# Patient Record
Sex: FEMALE | Race: WHITE | NOT HISPANIC OR LATINO | Employment: FULL TIME | URBAN - METROPOLITAN AREA
[De-identification: names, ages, dates, MRNs, and addresses within clinical notes are randomized per-mention and may not be internally consistent; named-entity substitution may affect disease eponyms.]

---

## 2022-11-19 ENCOUNTER — OFFICE VISIT (OUTPATIENT)
Dept: URGENT CARE | Facility: URGENT CARE | Age: 42
End: 2022-11-19
Payer: COMMERCIAL

## 2022-11-19 VITALS
SYSTOLIC BLOOD PRESSURE: 120 MMHG | TEMPERATURE: 97.1 F | DIASTOLIC BLOOD PRESSURE: 66 MMHG | OXYGEN SATURATION: 98 % | HEART RATE: 70 BPM | WEIGHT: 187.7 LBS

## 2022-11-19 DIAGNOSIS — J02.9 SORE THROAT: Primary | ICD-10-CM

## 2022-11-19 LAB
DEPRECATED S PYO AG THROAT QL EIA: NEGATIVE
GROUP A STREP BY PCR: NOT DETECTED

## 2022-11-19 PROCEDURE — 87651 STREP A DNA AMP PROBE: CPT | Performed by: PHYSICIAN ASSISTANT

## 2022-11-19 PROCEDURE — 99203 OFFICE O/P NEW LOW 30 MIN: CPT | Performed by: PHYSICIAN ASSISTANT

## 2022-11-19 NOTE — PROGRESS NOTES
Assessment & Plan     Sore throat  Likely viral, await PCR, she is nontoxic appearing, no worrisome trismus, drooling, hot potato voice or signs on exam.  Will hold off on abx at this time.   Push fluids, rest and ibuprofen or tylenol for comfort.    RTC for persistent or worsening sx.     - Streptococcus A Rapid Screen w/Reflex to PCR - Clinic Collect  - Group A Streptococcus PCR Throat Swab  Trish Ames PA-C    [facility] [place] Gavin is a 42 year old female who presents to clinic today for the following health issues:  Chief Complaint   Patient presents with     Pharyngitis     With swollen glands     Otalgia     HPI: pt presents to urgent care with concerns re: ST.  Able to swallow saliva but painful  No fevers, no uri sx.    6 year old son sick with bad cough.    Some mild  respiratory sx with negative covid 19 test about a week ago, sx improving generally.         Review of Systems  Constitutional, HEENT, cardiovascular, pulmonary, gi and gu systems are negative, except as otherwise noted.      Objective    /66 (BP Location: Right arm)   Pulse 70   Temp 97.1  F (36.2  C) (Tympanic)   Wt 85.1 kg (187 lb 11.2 oz)   SpO2 98%   Physical Exam   Pt is in no acute distress and appears well  Ears patent B:  TM s intact, non-injected. All land marks easily visibile    Nasal mucosa is non-edematous, no discharge.    Pharynx: erythematous, tonsils 2+ hypertrophied, mild  exudate   Neck supple:tender anterior cervical adenopathy  Lungs: CTA  Heart: RRR, no murmur, no thrills or heaves   Ext: no edema  Skin: no rashes    Results for orders placed or performed in visit on 11/19/22   Streptococcus A Rapid Screen w/Reflex to PCR - Clinic Collect     Status: Normal    Specimen: Throat; Swab   Result Value Ref Range    Group A Strep antigen Negative Negative   Group A Streptococcus PCR Throat Swab     Status: Normal    Specimen: Throat; Swab   Result Value Ref Range     Group A strep by PCR Not Detected Not Detected    Narrative    The Xpert Xpress Strep A test, performed on the [employer], is a rapid, qualitative in vitro diagnostic test for the detection of Streptococcus pyogenes (Group A ß-hemolytic Streptococcus, Strep A) in throat swab specimens from patients with signs and symptoms of pharyngitis. The Xpert Xpress Strep A test can be used as an aid in the diagnosis of Group A Streptococcal pharyngitis. The assay is not intended to monitor treatment for Group A Streptococcus infections. The Xpert Xpress Strep A test utilizes an automated real-time polymerase chain reaction (PCR) to detect Streptococcus pyogenes DNA.